# Patient Record
Sex: MALE | Race: ASIAN | NOT HISPANIC OR LATINO | Employment: STUDENT | URBAN - METROPOLITAN AREA
[De-identification: names, ages, dates, MRNs, and addresses within clinical notes are randomized per-mention and may not be internally consistent; named-entity substitution may affect disease eponyms.]

---

## 2020-09-04 ENCOUNTER — NURSE TRIAGE (OUTPATIENT)
Dept: NURSING | Facility: CLINIC | Age: 20
End: 2020-09-04

## 2020-09-05 NOTE — TELEPHONE ENCOUNTER
"Spoke w/ caller using Mandarin  #[id number] from [employer].     Friend/caller states 10 min ago pt got itchy hives all over and SOB Still able to speak. Pt denies known allergies but says he's had \"problems w/ rashes in the past\" and earlier tonight he ate crab. No reaction to shellfish or seafood in past. Face getting red and swelling. Advised hang up and call 911 now. They have no Benadryl or Epi Pen available. Pt did take Claritin. They hung up presumably to call 911.     Reason for Disposition    Difficulty breathing or wheezing now    Additional Information    Negative: [1] Life-threatening reaction (anaphylaxis) in the past to similar substance (e.g., food, insect bite/sting, chemical, etc.) AND [2] < 2 hours since exposure    Protocols used: HIVES-A-AH      "

## 2021-11-16 ENCOUNTER — HOSPITAL ENCOUNTER (EMERGENCY)
Facility: CLINIC | Age: 21
Discharge: HOME | End: 2021-11-16
Attending: EMERGENCY MEDICINE | Admitting: EMERGENCY MEDICINE
Payer: COMMERCIAL

## 2021-11-16 VITALS
DIASTOLIC BLOOD PRESSURE: 97 MMHG | WEIGHT: 165.34 LBS | HEIGHT: 71 IN | HEART RATE: 83 BPM | SYSTOLIC BLOOD PRESSURE: 143 MMHG | BODY MASS INDEX: 23.15 KG/M2 | OXYGEN SATURATION: 96 % | TEMPERATURE: 98.8 F | RESPIRATION RATE: 19 BRPM

## 2021-11-16 DIAGNOSIS — T78.40XA ALLERGIC REACTION, INITIAL ENCOUNTER: ICD-10-CM

## 2021-11-16 PROCEDURE — 99284 EMERGENCY DEPT VISIT MOD MDM: CPT | Performed by: EMERGENCY MEDICINE

## 2021-11-16 PROCEDURE — 99283 EMERGENCY DEPT VISIT LOW MDM: CPT | Performed by: EMERGENCY MEDICINE

## 2021-11-16 PROCEDURE — 250N000013 HC RX MED GY IP 250 OP 250 PS 637: Performed by: EMERGENCY MEDICINE

## 2021-11-16 RX ORDER — DIPHENHYDRAMINE HCL 50 MG
50 CAPSULE ORAL ONCE
Status: COMPLETED | OUTPATIENT
Start: 2021-11-16 | End: 2021-11-16

## 2021-11-16 RX ORDER — METHYLPREDNISOLONE 4 MG
TABLET, DOSE PACK ORAL
Qty: 21 TABLET | Refills: 0 | Status: SHIPPED | OUTPATIENT
Start: 2021-11-16

## 2021-11-16 RX ORDER — DIPHENHYDRAMINE HCL 25 MG
50 TABLET ORAL EVERY 6 HOURS PRN
Qty: 20 TABLET | Refills: 0 | Status: SHIPPED | OUTPATIENT
Start: 2021-11-16

## 2021-11-16 RX ORDER — FAMOTIDINE 20 MG/1
40 TABLET, FILM COATED ORAL ONCE
Status: COMPLETED | OUTPATIENT
Start: 2021-11-16 | End: 2021-11-16

## 2021-11-16 RX ADMIN — FAMOTIDINE 40 MG: 20 TABLET ORAL at 21:49

## 2021-11-16 RX ADMIN — DIPHENHYDRAMINE HYDROCHLORIDE 50 MG: 50 CAPSULE ORAL at 21:49

## 2021-11-16 ASSESSMENT — MIFFLIN-ST. JEOR: SCORE: 1775

## 2021-11-17 NOTE — DISCHARGE INSTRUCTIONS
Please make an appointment to follow up with Your Primary Care Provider in 3-5 days for reassessment of your symptoms.

## 2021-11-17 NOTE — ED TRIAGE NOTES
Pt presents to triage for evaluation of a rash. The pt has a rash to his upper forehead. The pt states it is also on his scalp and is very itchy. Pt states that the rash and itchiness are getting worse, so he presents.  Pt airway is intact and pt is in no acute distress.    Pt denies any new shampoos, foods, or any other changes.

## 2021-11-17 NOTE — ED PROVIDER NOTES
"    [place] EMERGENCY DEPARTMENT ([facility])  11/16/21  History     Chief Complaint   Patient presents with     Allergic Reaction     rash     The history is provided by the patient.     Pamela Casillas is a 21 year old male who presents to the Emergency Department for evaluation of rash to his head and neck.  He reports that the rash has persisted for 1 week, he is not sure of what he was exposed to.  He states that the rash is itchy, but not painful.  He states that he does not have a history of rashes like this.  He reports having an allergy to crab.  He denies having any chronic medical problems.  He reports using diphenhydramine for symptoms, with no relief.  He denies experiencing a sore throat, swallowing, or shortness of breath.    Past Medical History  History reviewed. No pertinent past medical history.  No past surgical history on file.  diphenhydrAMINE (BENADRYL) 25 MG tablet  methylPREDNISolone (MEDROL DOSEPAK) 4 MG tablet therapy pack      Allergies   Allergen Reactions     Shellfish-Derived Products Angioedema     Family History  No family history on file.  Social History   Social History     Tobacco Use     Smoking status: None     Smokeless tobacco: None   Substance Use Topics     Alcohol use: None     Drug use: None      Past medical history, past surgical history, medications, allergies, family history, and social history were reviewed with the patient. No additional pertinent items.     I have reviewed the Medications, Allergies, Past Medical and Surgical History, and Social History in the Epic system.    Review of Systems  A complete review of systems was performed with pertinent positives and negatives noted in the HPI, and all other systems negative.    Physical Exam   BP: (!) 143/97  Pulse: 83  Temp: 98.8  F (37.1  C)  Resp: 19  Height: 180 cm (5' 10.87\")  Weight: 75 kg (165 lb 5.5 oz)  SpO2: 96 %      Physical Exam  Vitals and nursing note reviewed.   Constitutional:       General: He is " not in acute distress.     Appearance: Normal appearance.   HENT:      Head: Normocephalic.      Nose: Nose normal.      Mouth/Throat:      Pharynx: Oropharynx is clear.   Eyes:      Pupils: Pupils are equal, round, and reactive to light.   Cardiovascular:      Rate and Rhythm: Normal rate and regular rhythm.   Pulmonary:      Effort: Pulmonary effort is normal.   Abdominal:      General: There is no distension.   Musculoskeletal:         General: No deformity. Normal range of motion.      Cervical back: Normal range of motion and neck supple. No rigidity or tenderness.   Skin:     General: Skin is warm.      Comments: Scattered erythematous macules as seen in picture below.  Predominantly over the forehead, bilateral cheeks.  There is 1 area on the neck.  No involvement of the mucous membranes.  No vesicles.   Neurological:      Mental Status: He is alert and oriented to person, place, and time.   Psychiatric:         Mood and Affect: Mood normal.                 ED Course     At 9:30 PM the patient was seen and examined by Jamar Melissa DO in Room [facility].         No results found for this or any previous visit (from the past 24 hour(s)).  Medications   diphenhydrAMINE (BENADRYL) capsule 50 mg (50 mg Oral Given 11/16/21 2149)   famotidine (PEPCID) tablet 40 mg (40 mg Oral Given 11/16/21 2149)             Assessments & Plan (with Medical Decision Making)   Patient presents for evaluation of rash on forehead, face, area and neck.  Has had the symptoms for 1 week.  No obvious exposure, but did buy a new shampoo 3 to 4 weeks ago.  Has history of allergy to crab.    Differential diagnosis includes anaphylaxis, anaphylactic shock, irritant dermatitis, contact dermatitis, atopic dermatitis    On exam, there is scattered erythematous macules over the forehead, one spot on his neck.  Does seem most consistent with a contact dermatitis.  Exam not consistent with vasculitis.  No oral lesions.  Vital signs are stable, no  evidence of anaphylactic shock.    Patient was given Benadryl and famotidine with mild improvement in symptoms.  Patient noted that these lesions can take up to 1 to 2 weeks to resolve.  Will be discharged with diphenhydramine and Medrol Dosepak.  Recommending PCP follow-up for reassessment of symptoms.  May need allergy testing.  Patient also noted that he purchased a new shampoo 1 month ago.  Educated him to discontinue the shampoo.    I have reviewed the nursing notes.    I have reviewed the findings, diagnosis, plan and need for follow up with the patient.    Discharge Medication List as of 11/16/2021 10:43 PM      START taking these medications    Details   diphenhydrAMINE (BENADRYL) 25 MG tablet Take 2 tablets (50 mg) by mouth every 6 hours as needed for itching or allergies, Disp-20 tablet, R-0, Local Print      methylPREDNISolone (MEDROL DOSEPAK) 4 MG tablet therapy pack Follow Package Directions, Disp-21 tablet, R-0, Local Print             Final diagnoses:   Allergic reaction, initial encounter       IJalen am serving as a trained medical scribe to document services personally performed by Jamar Melissa DO, based on the provider's statements to me.      IJamar DO, was physically present and have reviewed and verified the accuracy of this note documented by Jalen Beckett.     Jamar Melissa DO  11/16/2021   [facility] EMERGENCY DEPARTMENT     Jamar Melissa DO  11/17/21 0852

## 2021-12-01 ENCOUNTER — HOSPITAL ENCOUNTER (EMERGENCY)
Facility: CLINIC | Age: 21
Discharge: HOME | End: 2021-12-01
Attending: EMERGENCY MEDICINE | Admitting: EMERGENCY MEDICINE
Payer: COMMERCIAL

## 2021-12-01 VITALS
RESPIRATION RATE: 16 BRPM | BODY MASS INDEX: 23.15 KG/M2 | HEART RATE: 99 BPM | OXYGEN SATURATION: 96 % | TEMPERATURE: 98.8 F | WEIGHT: 165.34 LBS | SYSTOLIC BLOOD PRESSURE: 151 MMHG | DIASTOLIC BLOOD PRESSURE: 88 MMHG | HEIGHT: 71 IN

## 2021-12-01 DIAGNOSIS — L50.9 URTICARIA: ICD-10-CM

## 2021-12-01 PROCEDURE — 99283 EMERGENCY DEPT VISIT LOW MDM: CPT | Performed by: EMERGENCY MEDICINE

## 2021-12-01 PROCEDURE — 250N000013 HC RX MED GY IP 250 OP 250 PS 637: Performed by: EMERGENCY MEDICINE

## 2021-12-01 PROCEDURE — 99284 EMERGENCY DEPT VISIT MOD MDM: CPT | Performed by: EMERGENCY MEDICINE

## 2021-12-01 RX ORDER — DIPHENHYDRAMINE HCL 25 MG
25 TABLET ORAL EVERY 6 HOURS PRN
Qty: 30 TABLET | Refills: 0 | Status: SHIPPED | OUTPATIENT
Start: 2021-12-01

## 2021-12-01 RX ORDER — DIPHENHYDRAMINE HCL 25 MG
25 CAPSULE ORAL ONCE
Status: COMPLETED | OUTPATIENT
Start: 2021-12-01 | End: 2021-12-01

## 2021-12-01 RX ADMIN — Medication 25 MG: at 17:17

## 2021-12-01 ASSESSMENT — MIFFLIN-ST. JEOR: SCORE: 1775

## 2021-12-01 NOTE — ED PROVIDER NOTES
ED Provider Note  [facility]      History     Chief Complaint   Patient presents with     Medication Refill     The history is provided by the patient and medical records.     Pamela Casillas is an otherwise healthy 21 year old male presenting to the ED for evaluation of a rash. Patient was seen here in the ED on 11/16 for a rash on the forehead, face, and neck for 1 week. He had no obvious exposure, but had bought a new shampoo 3-4 weeks prior. He was given Benadryl and famotidine with mild improvement in symptoms and discharged with prescriptions for diphenhydramine and Medrol Dosepak.     Patient reports that he discontinued use of the new shampoo and returned to using a brand he had used previously without issue. He completed the Medrol Dosepak and took the entirety of the Benadryl prescription. He states that after completing these the rash on his head, neck, and face resolved, but he then had the appearance of a new rash all over his body. Since stopping the medications he has had an waxing and waning rash on the arms, back, inner thighs, and abdomen. The rash is very itchy. He previously only had rash on the head, which has completely resolved now. He reports currently he only has a rash near his under arms. No rash in the mouth, on the palms, or soles of the feet. He has been unable to identify a trigger. He has used no new soaps, detergents, or deodorants. His roommate does not have similar symptoms. He denies fever, cough, chest pain, trouble swallowing, rash in the mouth or on his genitals, shortness of breath, abdominal pain, nausea, vomiting, diarrhea, and recent travel. 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 He takes Loratadine. Patient also notes that he had stress mediated urticaria 4 years ago while studying for his college entrance exams.     Past Medical History  No past medical history on file.  No past surgical history on file.  diphenhydrAMINE (BENADRYL) 25 MG  "tablet  diphenhydrAMINE (BENADRYL) 25 MG tablet  methylPREDNISolone (MEDROL DOSEPAK) 4 MG tablet therapy pack      Allergies   Allergen Reactions     Shellfish-Derived Products Angioedema     Family History  No family history on file.  Social History   Social History     Tobacco Use     Smoking status: Not on file     Smokeless tobacco: Not on file   Substance Use Topics     Alcohol use: Not on file     Drug use: Not on file      Past medical history, past surgical history, medications, allergies, family history, and social history were reviewed with the patient. No additional pertinent items.       Review of Systems  A complete review of systems was performed with pertinent positives and negatives noted in the HPI, and all other systems negative.    Physical Exam   BP: (!) 151/88  Pulse: 99  Temp: 98.8  F (37.1  C)  Resp: 16  Height: 180 cm (5' 10.87\")  Weight: 75 kg (165 lb 5.5 oz)  SpO2: 96 %  Physical Exam  Vitals reviewed.   Constitutional:       General: He is not in acute distress.     Appearance: He is well-developed.   HENT:      Head: Normocephalic and atraumatic.      Mouth/Throat:      Mouth: Mucous membranes are moist.      Pharynx: No oropharyngeal exudate or posterior oropharyngeal erythema.   Eyes:      Extraocular Movements: Extraocular movements intact.      Conjunctiva/sclera: Conjunctivae normal.      Pupils: Pupils are equal, round, and reactive to light.   Cardiovascular:      Rate and Rhythm: Normal rate and regular rhythm.      Pulses: Normal pulses.      Heart sounds: Normal heart sounds. No murmur heard.      Pulmonary:      Effort: Pulmonary effort is normal. No respiratory distress.      Breath sounds: Normal breath sounds. No stridor. No wheezing or rales.   Abdominal:      General: Bowel sounds are normal. There is no distension.      Palpations: Abdomen is soft. There is no mass.      Tenderness: There is no abdominal tenderness. There is no guarding or rebound.   Musculoskeletal:    "      General: No swelling or tenderness. Normal range of motion.      Cervical back: Normal range of motion and neck supple.   Skin:     General: Skin is warm and dry.      Capillary Refill: Capillary refill takes less than 2 seconds.      Comments: Erythematous rash similar to a hive near the axilla bilaterally with evidence of some excoriation. No other areas of rash. No rash on the palms, soles, or in the mouth. No petechiae or purpura. No bullae. No crepitus. No mucous membrane involvement.    Neurological:      General: No focal deficit present.      Mental Status: He is alert and oriented to person, place, and time.      GCS: GCS eye subscore is 4. GCS verbal subscore is 5. GCS motor subscore is 6.      Cranial Nerves: No cranial nerve deficit.      Sensory: No sensory deficit.      Motor: No weakness or abnormal muscle tone.   Psychiatric:         Mood and Affect: Mood normal.         ED Course      Procedures        The medical record was reviewed and interpreted.  Managed outpatient prescription medications.       No results found for any visits on 12/01/21.  Medications   diphenhydrAMINE (BENADRYL) capsule 25 mg (25 mg Oral Given 12/1/21 1717)        Assessments & Plan (with Medical Decision Making)   Patient who was recently treated for possible allergic reaction approximately 2 weeks ago presents asking for refill of Benadryl as he is still continued to have intermittent rashes noted.  Currently he has a hive-like rash near his axilla bilaterally with some evidence of excoriation and he has reported it is pruritic.  He otherwise has no evidence of rash.  He has no evidence of involvement of the mucous membranes or any rash on the palms or soles.  He has had prior history of stress-induced urticaria previously.  Does question whether this may be related to this rash.  He is otherwise healthy and overall well-appearing.  His vital signs are within normal limits and he is afebrile.  Question whether this  may be stress-induced urticaria or potentially intermittent rash related to previous allergic reaction.  Patient is in agreement with no further laboratory studies at this time and would just like a refill of Benadryl and we will also refer him to [facility] and dermatology as this is persisting.  He was given a dose of Benadryl here.  He was also given a prescription.  Advised that he needed to follow-up with primary care and dermatology as soon as possible for further evaluation.  See no signs of life-threatening rash at this time.  No signs of Cotto-Suresh syndrome, TEN.  Discussed that we do not know the exact cause of this rash and thus it is important for him to follow-up.  He was given indications for return to the emergency department.  He voiced understanding was comfortable with this plan.  He was discharged in stable condition.    I have reviewed the nursing notes. I have reviewed the findings, diagnosis, plan and need for follow up with the patient.    Discharge Medication List as of 12/1/2021  5:18 PM      START taking these medications    Details   !! diphenhydrAMINE (BENADRYL) 25 MG tablet Take 1 tablet (25 mg) by mouth every 6 hours as needed for itching or allergies, Disp-30 tablet, R-0, Local Print       !! - Potential duplicate medications found. Please discuss with provider.          Final diagnoses:   Urticaria   IBobbi, am serving as a trained medical scribe to document services personally performed by Danika Potts MD, based on the provider's statements to me.     IDanika MD, was physically present and have reviewed and verified the accuracy of this note documented by Bobbi Valenzuela.    --  Danika Potts MD  [facility] EMERGENCY DEPARTMENT  12/1/2021     Danika Potts MD  01/20/22 0211

## 2021-12-01 NOTE — DISCHARGE INSTRUCTIONS
Please make an appointment to follow up with Primary Care - [facility] (phone: [phone]) and Dermatology Clinic (phone: [phone]) as soon as possible.    Can take benadryl as needed for itching or hives.     Return to the ED if you develop worsening rash, fever, shortness of breath, lip swelling, tongue swelling, vomiting, chest pain, or any new or worsening concerns.

## 2021-12-01 NOTE — ED TRIAGE NOTES
Pt was seen in ED 11/16 for allergic reaction. Pt ran out of benadryl and is still having itching over whole body and would like another rx for benadryl.

## 2022-01-30 ENCOUNTER — HEALTH MAINTENANCE LETTER (OUTPATIENT)
Age: 22
End: 2022-01-30

## 2022-04-26 ENCOUNTER — HOSPITAL ENCOUNTER (EMERGENCY)
Facility: CLINIC | Age: 22
Discharge: HOME | End: 2022-04-27
Attending: EMERGENCY MEDICINE | Admitting: EMERGENCY MEDICINE
Payer: COMMERCIAL

## 2022-04-26 ENCOUNTER — APPOINTMENT (OUTPATIENT)
Dept: GENERAL RADIOLOGY | Facility: CLINIC | Age: 22
End: 2022-04-26
Attending: EMERGENCY MEDICINE
Payer: COMMERCIAL

## 2022-04-26 VITALS
RESPIRATION RATE: 16 BRPM | TEMPERATURE: 97.9 F | OXYGEN SATURATION: 99 % | SYSTOLIC BLOOD PRESSURE: 125 MMHG | HEART RATE: 91 BPM | DIASTOLIC BLOOD PRESSURE: 87 MMHG

## 2022-04-26 DIAGNOSIS — R07.89 ATYPICAL CHEST PAIN: ICD-10-CM

## 2022-04-26 LAB
ALBUMIN SERPL-MCNC: 4.4 G/DL (ref 3.4–5)
ALP SERPL-CCNC: 61 U/L (ref 40–150)
ALT SERPL W P-5'-P-CCNC: 20 U/L (ref 0–70)
ANION GAP SERPL CALCULATED.3IONS-SCNC: 6 MMOL/L (ref 3–14)
AST SERPL W P-5'-P-CCNC: 15 U/L (ref 0–45)
BASOPHILS # BLD AUTO: 0 10E3/UL (ref 0–0.2)
BASOPHILS NFR BLD AUTO: 0 %
BILIRUB SERPL-MCNC: 0.8 MG/DL (ref 0.2–1.3)
BUN SERPL-MCNC: 8 MG/DL (ref 7–30)
CALCIUM SERPL-MCNC: 9.3 MG/DL (ref 8.5–10.1)
CHLORIDE BLD-SCNC: 110 MMOL/L (ref 94–109)
CO2 SERPL-SCNC: 26 MMOL/L (ref 20–32)
CREAT SERPL-MCNC: 0.67 MG/DL (ref 0.66–1.25)
EOSINOPHIL # BLD AUTO: 0.1 10E3/UL (ref 0–0.7)
EOSINOPHIL NFR BLD AUTO: 1 %
ERYTHROCYTE [DISTWIDTH] IN BLOOD BY AUTOMATED COUNT: 11.9 % (ref 10–15)
GFR SERPL CREATININE-BSD FRML MDRD: >90 ML/MIN/1.73M2
GLUCOSE BLD-MCNC: 107 MG/DL (ref 70–99)
HCT VFR BLD AUTO: 48.4 % (ref 40–53)
HGB BLD-MCNC: 16.9 G/DL (ref 13.3–17.7)
IMM GRANULOCYTES # BLD: 0 10E3/UL
IMM GRANULOCYTES NFR BLD: 0 %
LYMPHOCYTES # BLD AUTO: 1.3 10E3/UL (ref 0.8–5.3)
LYMPHOCYTES NFR BLD AUTO: 24 %
MAGNESIUM SERPL-MCNC: 2.1 MG/DL (ref 1.6–2.3)
MCH RBC QN AUTO: 30 PG (ref 26.5–33)
MCHC RBC AUTO-ENTMCNC: 34.9 G/DL (ref 31.5–36.5)
MCV RBC AUTO: 86 FL (ref 78–100)
MONOCYTES # BLD AUTO: 0.4 10E3/UL (ref 0–1.3)
MONOCYTES NFR BLD AUTO: 7 %
NEUTROPHILS # BLD AUTO: 3.7 10E3/UL (ref 1.6–8.3)
NEUTROPHILS NFR BLD AUTO: 68 %
NRBC # BLD AUTO: 0 10E3/UL
NRBC BLD AUTO-RTO: 0 /100
PLATELET # BLD AUTO: 225 10E3/UL (ref 150–450)
POTASSIUM BLD-SCNC: 3.9 MMOL/L (ref 3.4–5.3)
PROT SERPL-MCNC: 7.7 G/DL (ref 6.8–8.8)
RBC # BLD AUTO: 5.63 10E6/UL (ref 4.4–5.9)
SODIUM SERPL-SCNC: 142 MMOL/L (ref 133–144)
TROPONIN I SERPL HS-MCNC: 4 NG/L
WBC # BLD AUTO: 5.6 10E3/UL (ref 4–11)

## 2022-04-26 PROCEDURE — 85025 COMPLETE CBC W/AUTO DIFF WBC: CPT | Performed by: EMERGENCY MEDICINE

## 2022-04-26 PROCEDURE — 71046 X-RAY EXAM CHEST 2 VIEWS: CPT

## 2022-04-26 PROCEDURE — 93308 TTE F-UP OR LMTD: CPT | Performed by: EMERGENCY MEDICINE

## 2022-04-26 PROCEDURE — 99285 EMERGENCY DEPT VISIT HI MDM: CPT | Mod: 25 | Performed by: EMERGENCY MEDICINE

## 2022-04-26 PROCEDURE — 80053 COMPREHEN METABOLIC PANEL: CPT | Performed by: EMERGENCY MEDICINE

## 2022-04-26 PROCEDURE — 250N000013 HC RX MED GY IP 250 OP 250 PS 637: Performed by: EMERGENCY MEDICINE

## 2022-04-26 PROCEDURE — 93308 TTE F-UP OR LMTD: CPT | Mod: 26 | Performed by: EMERGENCY MEDICINE

## 2022-04-26 PROCEDURE — 84484 ASSAY OF TROPONIN QUANT: CPT | Performed by: EMERGENCY MEDICINE

## 2022-04-26 PROCEDURE — 250N000009 HC RX 250: Performed by: EMERGENCY MEDICINE

## 2022-04-26 PROCEDURE — 83735 ASSAY OF MAGNESIUM: CPT | Performed by: NURSE PRACTITIONER

## 2022-04-26 PROCEDURE — 93010 ELECTROCARDIOGRAM REPORT: CPT | Mod: 59 | Performed by: EMERGENCY MEDICINE

## 2022-04-26 PROCEDURE — 71046 X-RAY EXAM CHEST 2 VIEWS: CPT | Mod: 26 | Performed by: RADIOLOGY

## 2022-04-26 PROCEDURE — 36415 COLL VENOUS BLD VENIPUNCTURE: CPT | Performed by: EMERGENCY MEDICINE

## 2022-04-26 PROCEDURE — 93005 ELECTROCARDIOGRAM TRACING: CPT | Performed by: EMERGENCY MEDICINE

## 2022-04-26 RX ADMIN — LIDOCAINE HYDROCHLORIDE 30 ML: 20 SOLUTION ORAL; TOPICAL at 20:54

## 2022-04-26 NOTE — ED TRIAGE NOTES
Pt reports left sided chest discomfort x2 weeks. Pain is not reproducible with exertion. Pt says chest discomfort does not keep him up at night.      Triage Assessment     Row Name 04/26/22 2240       Triage Assessment (Adult)    Airway WDL WDL       Respiratory WDL    Respiratory WDL WDL       Skin Circulation/Temperature WDL    Skin Circulation/Temperature WDL WDL       Cardiac WDL    Cardiac WDL X;chest pain       Chest Pain Assessment    Chest Pain Location anterior chest, left

## 2022-04-27 LAB
ATRIAL RATE - MUSE: 70 BPM
DIASTOLIC BLOOD PRESSURE - MUSE: NORMAL MMHG
INTERPRETATION ECG - MUSE: NORMAL
P AXIS - MUSE: 74 DEGREES
PR INTERVAL - MUSE: 144 MS
QRS DURATION - MUSE: 88 MS
QT - MUSE: 374 MS
QTC - MUSE: 403 MS
R AXIS - MUSE: 98 DEGREES
SYSTOLIC BLOOD PRESSURE - MUSE: NORMAL MMHG
T AXIS - MUSE: 36 DEGREES
VENTRICULAR RATE- MUSE: 70 BPM

## 2022-04-27 NOTE — ED PROVIDER NOTES
History     Chief Complaint   Patient presents with     Chest Pain     HPI  Pamela Casillas is a 22 year old male with PMH notable for urticaria who presents to the ED with chest discomfort.  Symptom onset 2 weeks ago, started after staying up late all night 1 night.  Pain is waxed and waned since then.  He notes it is worse when laying down, has been keeping him awake.  He denies it being painful, states it is just uncomfortable.  Quality described as heavy.  Mild associated shortness of breath.  No significant cough nor fever.  No recent vomiting.  No recent travel, no leg swelling, no history of DVT/PE.    Past Medical History  No past medical history on file.  No past surgical history on file.  diphenhydrAMINE (BENADRYL) 25 MG tablet  diphenhydrAMINE (BENADRYL) 25 MG tablet  methylPREDNISolone (MEDROL DOSEPAK) 4 MG tablet therapy pack      Allergies   Allergen Reactions     Shellfish-Derived Products Angioedema     Social History       Past medical history and social history were reviewed with the patient. Additional pertinent items: None     Review of Systems  A complete review of systems was performed with pertinent positives and negatives noted in the HPI, and all other systems negative.    Physical Exam   BP: 125/87  Pulse: 91  Temp: 97.9  F (36.6  C)  Resp: 16  SpO2: 99 %    Physical Exam  General: No acute distress. Appears stated age.   HENT: MMM, no oropharyngeal lesions  Eyes: PERRL, normal sclerae  Cardio: Regular rate. Regular rhythm. No murmur, no rub. Extremities well perfused  Resp: Normal work of breathing, normal respiratory rate.  Chest/Back: no visual signs of trauma, palpation near the left sternal border reproduced the discomfort  Abdomen: no tenderness, non-distended, no rebound, no guarding  Neuro: alert and fully oriented. CN II-XII grossly intact. Grossly normal strength and sensation in all extremities.   MSK: no deformities. Grossly normal ROM in extremities.   Integumentary/Skin: no rash  visualized, normal color  Psych: normal affect, normal behavior    ED Course      Procedures  Results for orders placed during the hospital encounter of 04/26/22    POC US ECHO LIMITED    Impression  Limited Bedside ED Cardiac Ultrasound  PROCEDURE: PERFORMED BY: Dr. Aaron Broussard MD  INDICATIONS/SYMPTOM:  Chest Pain  PROBE: Cardiac phased array probe  BODY LOCATION: Chest (cardiac)  FINDINGS: The ultrasound was performed utilizing the subcostal, parasternal long axis, parasternal short axis and apical 4 chamber views.  Grossly normal LV contractility. No RV dilation visualized. No pericardial effusion visualized. There is a hypoechoic area near the aortic outflow tract that might be a septal defect, but there does not appear to be flow across the area.  INTERPRETATION:    Grossly normal LV contractility. No pericardial effusion. No RV dilation. Possible septal defect.  IMAGE DOCUMENTATION: Images were archived to PACs system.            EKG Interpretation:      Interpreted by Aaron Broussard MD  Time reviewed: 2015  Symptoms at time of EKG: chest pain   Rhythm: normal sinus   Rate: Normal  Axis: Right Axis Deviation  Ectopy: none  Conduction: normal  ST Segments/ T Waves: No acute ischemic changes  Q Waves: small Q in inferior and V4-6 leads  Comparison to prior: No old EKG available    Clinical Impression: NSR with rightward axis and small inferior Q waves of unclear acuity       Labs Ordered and Resulted from Time of ED Arrival to Time of ED Departure   COMPREHENSIVE METABOLIC PANEL - Abnormal       Result Value    Sodium 142      Potassium 3.9      Chloride 110 (*)     Carbon Dioxide (CO2) 26      Anion Gap 6      Urea Nitrogen 8      Creatinine 0.67      Calcium 9.3      Glucose 107 (*)     Alkaline Phosphatase 61      AST 15      ALT 20      Protein Total 7.7      Albumin 4.4      Bilirubin Total 0.8      GFR Estimate >90     TROPONIN I - Normal    Troponin I High Sensitivity 4     MAGNESIUM - Normal     Magnesium 2.1     CBC WITH PLATELETS AND DIFFERENTIAL    WBC Count 5.6      RBC Count 5.63      Hemoglobin 16.9      Hematocrit 48.4      MCV 86      MCH 30.0      MCHC 34.9      RDW 11.9      Platelet Count 225      % Neutrophils 68      % Lymphocytes 24      % Monocytes 7      % Eosinophils 1      % Basophils 0      % Immature Granulocytes 0      NRBCs per 100 WBC 0      Absolute Neutrophils 3.7      Absolute Lymphocytes 1.3      Absolute Monocytes 0.4      Absolute Eosinophils 0.1      Absolute Basophils 0.0      Absolute Immature Granulocytes 0.0      Absolute NRBCs 0.0       XR Chest 2 Views   Final Result   IMPRESSION: Cardiomediastinal silhouette within normal limits. No focal consolidation or pleural effusion.      POC US ECHO LIMITED   Final Result   Limited Bedside ED Cardiac Ultrasound   PROCEDURE: PERFORMED BY: Dr. Aaron Broussard MD   INDICATIONS/SYMPTOM:  Chest Pain   PROBE: Cardiac phased array probe   BODY LOCATION: Chest (cardiac)   FINDINGS: The ultrasound was performed utilizing the subcostal, parasternal long axis, parasternal short axis and apical 4 chamber views.   Grossly normal LV contractility. No RV dilation visualized. No pericardial effusion visualized. There is a hypoechoic area near the aortic outflow tract that might be a septal defect, but there does not appear to be flow across the area.    INTERPRETATION:    Grossly normal LV contractility. No pericardial effusion. No RV dilation. Possible septal defect.    IMAGE DOCUMENTATION: Images were archived to PACs system.         Echocardiogram Complete    (Results Pending)          Assessments & Plan (with Medical Decision Making)   Patient presenting with 2 weeks chest pain. Vitals in the ED unremarkable. Nursing notes reviewed.     ECG shows NSR with rightward axis, high-sensitivity troponin wnl at 4 - ACS very unlikely. VTE risk factor profile very low, PERC met - PE very unlikely. Character and severity of pain less severe than  would be expected for aortic dissection. Bedside cardiac US demonstrates grossly normal LV contractility, no RV dilation, no pericardial effusion; did have an area of hypoechogenicity over the septum near the aortic outflow tract - unclear if this is a small septal defect or some shadow artifact. There was no murmur present and no colorlow jet across the hypoechoic area, which would make a septal defect less likely. Lack of ECG findings and lack of effusion makes pericarditis very unlikely. CXR without evidence of pneumonia, pneumothorax, pleural effusion, nor other visualized pathology.      In the ED, the patient's symptoms were managed with GI cocktail, with improvement in symptoms upon reassessment.     The complete clinical picture is most consistent with atypical chest pain, likely due to gastric reflux given improvement with GI cocktail and otherwise negative findings on broad work-up. After counseling on the diagnosis, work-up, and treatment plan, the patient was discharged to home. Outpatient complete echo ordered to better visualize the possible septal defect on POCUS. The patient was advised to follow-up with primary care in a few days. The patient was advised to return to the ED if worsening symptoms, or if there are any urgent/life-threatening concerns.     Final diagnoses:   Atypical chest pain     Discharge Medication List as of 4/27/2022 12:20 AM          --  Aaron Broussard MD   Emergency Medicine   [facility] EMERGENCY DEPARTMENT  4/26/2022     Aaron Broussard MD  04/27/22 5530

## 2022-04-27 NOTE — DISCHARGE INSTRUCTIONS
Instructions from your doctor today:  Emergency Department testing is focused on the potential causes of your symptoms that are the most dangerous possibilities, and cannot cover every possibility. Based on the evaluation, it was deemed sufficiently safe to discharge and continue management through the clinics. Thus, follow-up is very important to assess for improvement/worsening, potential further testing, and potential treatment adjustments. If you were given opioid pain medications or other medications that can make you drowsy while in the ED, you should not drive for at least several hours and not until you feel completely back to normal.     Please make an appointment to follow up with:  - Primary Care - [facility] (phone: [phone]) in 4-7 days  - If you do not have a primary care provider, you can be seen in follow-up and establish care by calling any of the clinics below:     - [facility] (phone: [phone])     - Primary Care / [facility] (phone: [phone])   - Have your clinic provider review the results from today's visit with you again, including any potential follow-up or additional testing that may be needed based on the results. Occasionally, incidental findings are found on later review by radiologists that may need follow-up.     Return to the Emergency Department immediately if you have worsening symptoms, or any other urgent or potentially life-threatening concerns.

## 2022-06-12 ENCOUNTER — HOSPITAL ENCOUNTER (EMERGENCY)
Facility: CLINIC | Age: 22
Discharge: HOME | End: 2022-06-12
Admitting: PHYSICIAN ASSISTANT
Payer: COMMERCIAL

## 2022-06-12 VITALS
DIASTOLIC BLOOD PRESSURE: 89 MMHG | TEMPERATURE: 97.7 F | HEART RATE: 74 BPM | RESPIRATION RATE: 15 BRPM | OXYGEN SATURATION: 100 % | SYSTOLIC BLOOD PRESSURE: 129 MMHG

## 2022-06-12 DIAGNOSIS — L50.9 URTICARIA: ICD-10-CM

## 2022-06-12 PROCEDURE — 99284 EMERGENCY DEPT VISIT MOD MDM: CPT | Performed by: PHYSICIAN ASSISTANT

## 2022-06-12 PROCEDURE — 99283 EMERGENCY DEPT VISIT LOW MDM: CPT | Performed by: PHYSICIAN ASSISTANT

## 2022-06-12 RX ORDER — METHYLPREDNISOLONE 4 MG
TABLET, DOSE PACK ORAL
Qty: 21 TABLET | Refills: 0 | Status: SHIPPED | OUTPATIENT
Start: 2022-06-12

## 2022-06-12 NOTE — ED NOTES
Bed: G  Expected date:   Expected time:   Means of arrival:   Comments:  Available for [address]

## 2022-06-12 NOTE — ED PROVIDER NOTES
ED Provider Note  [facility]      History     Chief Complaint   Patient presents with     Hives     HPI  Pamela Casillas is a 22 year old male past medical history significant for urticaria who presents the emergency department today with concerns for pruritic rash.  He states that about 6 to 7 days ago he started noticing rash initially localized to the anterior scalp, which then started involving the posterior aspect of his neck, right side of his hip, back and chest.  This is pruritic in character.  He denies other associated symptoms including any fevers chills body aches rhinorrhea nasal congestion or cough.  No shortness of breath chest pain, problems with swallowing.  He denies any GI symptoms.  He denies any known exposure to new lotions creams soaps or detergents.  He reports a history of shellfish allergy although has not had any shellfish that he is aware of.  Per EMR patient has been seen on several other occasions and here in the emergency department for allergic reaction, and he tells me that he has done well in the past with steroids.  He has been taking Benadryl 25 mg every 6 thus far.    Past Medical History  No past medical history on file.  No past surgical history on file.  methylPREDNISolone (MEDROL DOSEPAK) 4 MG tablet therapy pack  diphenhydrAMINE (BENADRYL) 25 MG tablet  diphenhydrAMINE (BENADRYL) 25 MG tablet  methylPREDNISolone (MEDROL DOSEPAK) 4 MG tablet therapy pack      Allergies   Allergen Reactions     Shellfish-Derived Products Angioedema     Family History  No family history on file.  Social History       Past medical history, past surgical history, medications, allergies, family history, and social history were reviewed with the patient. No additional pertinent items.       Review of Systems  A complete review of systems was performed with pertinent positives and negatives noted in the HPI, and all other systems negative.    Physical Exam   BP: 129/89  Pulse:  74  Temp: 97.7  F (36.5  C)  Resp: 15  SpO2: 100 %  Physical Exam    GENERAL APPEARANCE: The patient is well developed, well appearing, and in no acute distress.  HEAD:  Normocephalic and atraumatic.   EENT: No scleral icterus.  No conjunctival injection is noted.  Tympanic membranes without erythema or bulging.  Nares without erythema or lesions. Oropharynx is clear, without erythema or exudates.  Uvula midline.  NECK: Trachea is midline.No lymphadenopathy or tenderness.  LUNGS: Breath sounds are equal and clear bilaterally. No wheezes, rhonchi, or rales.  HEART: Regular rate and normal rhythm.  Radial pulses 2+ bilaterally.  ABDOMEN: Soft, flat, and benign. No mass, tenderness, guarding, or rebound.Bowel sounds are present.  EXTREMITIES: No cyanosis, clubbing, or edema.  NEUROLOGIC: No focal sensory or motor deficits are noted.  PSYCHIATRIC: The patient is awake, alert.  Appropriate mood and affect.  SKIN: Warm, dry, and well perfused. Good turgor.    ED Course        No results found for any visits on 06/12/22.  Medications - No data to display     Assessments & Plan (with Medical Decision Making)   This is a 22-year-old male presenting to the emergency department today with concerns for pruritic rash over the last 6 to 7 days.  Exam findings show patient has stable vital signs without tachycardia tachypnea or hypoxia.  Physical exam shows findings consistent with urticaria, generalized.  Patient has a history of urticarial rash in the past which was done well with Medrol Dosepak.  He has been using antihistamines without much success at home thus far, I do feel steroid taper would be reasonable.  He has no other findings to suggest infectious process today.  Prescription for methylprednisone Dosepak was sent to his preferred pharmacy and I recommend starting this today.  We discussed cetirizine instead of Benadryl if patient is feeling overly sleepy with the diphenhydramine.  Patient has no other questions or  concerns at this time.  Red flag signs were addressed, and they were in agreement with the patient care plan provided.    I have reviewed the nursing notes. I have reviewed the findings, diagnosis, plan and need for follow up with the patient.    Discharge Medication List as of 6/12/2022  1:58 PM      START taking these medications    Details   !! methylPREDNISolone (MEDROL DOSEPAK) 4 MG tablet therapy pack Follow Package Directions, Disp-21 tablet, R-0, E-Prescribe       !! - Potential duplicate medications found. Please discuss with provider.          Final diagnoses:   Urticaria     --    [facility] EMERGENCY DEPARTMENT  6/12/2022

## 2022-06-12 NOTE — DISCHARGE INSTRUCTIONS
Here in the emergency department, we discussed findings with your rash.  This appears to be consistent with hives otherwise known as urticaria.  We discussed use of antihistamine medication such as the Benadryl that you are taking.  Another option that would make you less sleepy would be Zyrtec otherwise known as cetirizine.  I did send a prescription for methylprednisolone steroid medicine to the [facility] pharmacy on [facility] that you requested.  We discussed that I would expect your rash to improve here within the next 2 to 3 days.  Recommend following up with primary care if the rash is continuing into the end of next week.  We discussed returning right away to urgent care or ER if you notice any new or worsening symptoms including any fevers chest pain shortness of breath problems with swallowing or breathing.

## 2022-06-12 NOTE — ED TRIAGE NOTES
Pt ambulatory to triage with c/o hives. HX shellfish allergy but denies any recent consumption. Pt states over the past 6 days has been having increased hives & itchiness. Hives currently covering back, chest, groin, and neck. Pt endorses taking 25mg benadryl Q6 with little relief. Pt denies any airway swelling/symptoms, CP, or SOB at this time. Pt A&OX4, VSS on RA, denies pain.      Triage Assessment     Row Name 06/12/22 8069       Triage Assessment (Adult)    Airway WDL WDL       Respiratory WDL    Respiratory WDL WDL       Skin Circulation/Temperature WDL    Skin Circulation/Temperature WDL X;temperature    Skin Temperature warm       Cardiac WDL    Cardiac WDL WDL       Peripheral/Neurovascular WDL    Peripheral Neurovascular WDL WDL       Cognitive/Neuro/Behavioral WDL    Cognitive/Neuro/Behavioral WDL WDL

## 2022-06-13 ENCOUNTER — PATIENT OUTREACH (OUTPATIENT)
Dept: CARE COORDINATION | Facility: CLINIC | Age: 22
End: 2022-06-13
Payer: COMMERCIAL

## 2022-06-13 DIAGNOSIS — Z71.89 OTHER SPECIFIED COUNSELING: ICD-10-CM

## 2022-06-13 NOTE — PROGRESS NOTES
Clinic Care Coordination Contact  [facility]: Post-Discharge Note  SITUATION                                                      Admission:    Admission Date: 06/12/22   Reason for Admission: Hives  Discharge:   Discharge Date: 06/12/22  Discharge Diagnosis: Hives    BACKGROUND                                                      Per hospital discharge summary and inpatient provider notes:    Pamela Casillas is a 22 year old male past medical history significant for urticaria who presents the emergency department today with concerns for pruritic rash.  He states that about 6 to 7 days ago he started noticing rash initially localized to the anterior scalp, which then started involving the posterior aspect of his neck, right side of his hip, back and chest.     ASSESSMENT      Enrollment  Primary Care Care Coordination Status: Not a Candidate    Discharge Assessment  How are you doing now that you are home?: feeling the same  How are your symptoms? (Red Flag symptoms escalate to triage hotline per guidelines): Unchanged  Do you feel your condition is stable enough to be safe at home until your provider visit?: Yes  Does the patient have their discharge instructions? : Yes  Does the patient have questions regarding their discharge instructions? : No  Were you started on any new medications or were there changes to any of your previous medications? : Yes  Does the patient have all of their medications?: Yes  Do you have questions regarding any of your medications? : No  Do you have all of your needed medical supplies or equipment (DME)?  (i.e. oxygen tank, CPAP, cane, etc.): Yes  Discharge follow-up appointment scheduled within 14 calendar days? : No  Is patient agreeable to assistance with scheduling? : No (planning to call clinic)    Post-op ([facility] Only)  If the patient had a surgery or procedure, do they have any questions for a nurse?: No         PLAN                                                       Outpatient Plan:     No future appointments.    Recommend following up with primary care if the rash is  continuing into the end of next week    For any urgent concerns, please contact our 24 hour nurse triage line: [phone] ([phone])       TOM Baires  [phone]

## 2022-09-24 ENCOUNTER — HEALTH MAINTENANCE LETTER (OUTPATIENT)
Age: 22
End: 2022-09-24

## 2023-05-08 ENCOUNTER — HEALTH MAINTENANCE LETTER (OUTPATIENT)
Age: 23
End: 2023-05-08

## 2024-07-14 ENCOUNTER — HEALTH MAINTENANCE LETTER (OUTPATIENT)
Age: 24
End: 2024-07-14